# Patient Record
Sex: FEMALE
[De-identification: names, ages, dates, MRNs, and addresses within clinical notes are randomized per-mention and may not be internally consistent; named-entity substitution may affect disease eponyms.]

---

## 2019-08-28 ENCOUNTER — RX ONLY (OUTPATIENT)
Age: 59
Setting detail: RX ONLY
End: 2019-08-28

## 2023-08-29 ENCOUNTER — APPOINTMENT (RX ONLY)
Dept: URBAN - METROPOLITAN AREA CLINIC 55 | Facility: CLINIC | Age: 63
Setting detail: DERMATOLOGY
End: 2023-08-29

## 2023-08-29 DIAGNOSIS — Z41.9 ENCOUNTER FOR PROCEDURE FOR PURPOSES OTHER THAN REMEDYING HEALTH STATE, UNSPECIFIED: ICD-10-CM

## 2023-08-29 PROCEDURE — ? COSMETIC CONSULTATION: GENERAL

## 2023-08-29 PROCEDURE — ? INVENTORY

## 2023-08-29 PROCEDURE — ? BOTOX

## 2023-08-29 NOTE — PROCEDURE: BOTOX
Lcl Root Units: 0
Show Nasal Units: Yes
Detail Level: Detailed
Forehead Units: 10
Glabellar Complex Units: 20
Consent obtained. Risks include but not limited to lid/brow ptosis, bruising, swelling, diplopia, temporary effect, incomplete chemical denervation.
Show Inventory Tab: Hide
Show Ucl Units: No
Additional Area 2 Location: Nasalis
Post-Care Instructions: Patient instructed to not lie down for 4 hours and limit physical activity for 24 hours.
Dilution (U/0.1 Cc): 4
Additional Area 1 Location: Lip
Incrementing Botox Units: By 0.5 Units
Additional Area 3 Location: gummy smile
n/a

## 2023-09-13 ENCOUNTER — APPOINTMENT (RX ONLY)
Dept: URBAN - METROPOLITAN AREA CLINIC 55 | Facility: CLINIC | Age: 63
Setting detail: DERMATOLOGY
End: 2023-09-13

## 2023-09-13 DIAGNOSIS — Z41.9 ENCOUNTER FOR PROCEDURE FOR PURPOSES OTHER THAN REMEDYING HEALTH STATE, UNSPECIFIED: ICD-10-CM

## 2023-09-13 PROCEDURE — ? BOTOX

## 2023-09-13 PROCEDURE — ? FILLERS

## 2023-09-13 PROCEDURE — ? INVENTORY

## 2023-09-13 NOTE — PROCEDURE: BOTOX
Left Periorbital Skin Units: 0
Show Forehead Units: Yes
Show Ucl Units: No
Forehead Units: 4
Show Inventory Tab: Hide
Consent obtained. Risks include but not limited to lid/brow ptosis, bruising, swelling, diplopia, temporary effect, incomplete chemical denervation.
Additional Area 3 Location: gummy smile
Post-Care Instructions: Patient instructed to not lie down for 4 hours and limit physical activity for 24 hours.
Additional Area 2 Location: Nasalis
Detail Level: Detailed
Additional Area 1 Location: Lip
Incrementing Botox Units: By 0.5 Units

## 2023-09-13 NOTE — PROCEDURE: FILLERS
Nasolabial Folds Filler Volume In Cc: 0
Filler: RHA 3
Consent: Written consent obtained. Risks include but not limited to bruising, beading, irregular texture, ulceration, infection, allergic reaction, scar formation, incomplete augmentation, temporary nature, and procedural pain.
Post-Care Instructions: After the procedure, patient instructed to apply ice to reduce swelling.
Include Cannula Information In Note?: No
Include Cannula Information In Note?: Yes
Detail Level: Detailed
Anesthesia Type: 1% lidocaine with epinephrine
Anesthesia Volume In Cc: 0.5
Include Cannula Size?: 27G
Additional Anesthesia Volume In Cc: 6
Map Statment: See Attach Map for Complete Details
Filler: RHA 4
Aspiration Statement: Aspiration was performed prior to injecting site with filler.

## 2024-03-28 ENCOUNTER — APPOINTMENT (RX ONLY)
Dept: URBAN - METROPOLITAN AREA CLINIC 55 | Facility: CLINIC | Age: 64
Setting detail: DERMATOLOGY
End: 2024-03-28

## 2024-03-28 DIAGNOSIS — Z41.9 ENCOUNTER FOR PROCEDURE FOR PURPOSES OTHER THAN REMEDYING HEALTH STATE, UNSPECIFIED: ICD-10-CM

## 2024-03-28 PROCEDURE — ? INVENTORY

## 2024-03-28 PROCEDURE — ? BOTOX

## 2024-03-28 NOTE — PROCEDURE: BOTOX
Mentalis Units: 0
Forehead Units: 14
Additional Area 4 Units: 4
Show Additional Area 5: Yes
Show Inventory Tab: Hide
Show Right And Left Brow Units: No
Consent obtained. Risks include but not limited to lid/brow ptosis, bruising, swelling, diplopia, temporary effect, incomplete chemical denervation.
Glabellar Complex Units: 20
Additional Area 3 Location: gummy smile
Depressor Anguli Oris Units: 6
Post-Care Instructions: Patient instructed to not lie down for 4 hours and limit physical activity for 24 hours.
Additional Area 2 Location: Nasalis
Additional Area 1 Location: Lip
Detail Level: Detailed
Incrementing Botox Units: By 0.5 Units
Additional Area 4 Location: Upper Cutaneous Lip

## 2024-08-08 ENCOUNTER — APPOINTMENT (RX ONLY)
Dept: URBAN - METROPOLITAN AREA CLINIC 55 | Facility: CLINIC | Age: 64
Setting detail: DERMATOLOGY
End: 2024-08-08

## 2024-08-08 DIAGNOSIS — Z41.9 ENCOUNTER FOR PROCEDURE FOR PURPOSES OTHER THAN REMEDYING HEALTH STATE, UNSPECIFIED: ICD-10-CM

## 2024-08-08 PROCEDURE — ? FILLERS

## 2024-08-08 PROCEDURE — ? BOTOX

## 2024-08-08 PROCEDURE — ? INVENTORY

## 2024-08-08 NOTE — PROCEDURE: FILLERS
Additional Area 4 Volume In Cc: 0
Include Documentation That Aspiration Was Performed Prior To Injecting Filler:: No
Anesthesia Volume In Cc: 0.5
Aspiration Statement: Aspiration was performed prior to injecting site with filler.
Additional Anesthesia Volume In Cc: 6
Include Cannula Information In Note?: Yes
Detail Level: Detailed
Filler: RHA 2
Include Cannula Size?: 27G
Consent: Written consent obtained. Risks include but not limited to bruising, beading, irregular texture, ulceration, infection, allergic reaction, scar formation, incomplete augmentation, temporary nature, and procedural pain.
Post-Care Instructions: After the procedure, patient instructed to apply ice to reduce swelling.
Map Statment: See Attach Map for Complete Details
Anesthesia Type: 1% lidocaine with epinephrine

## 2024-08-08 NOTE — PROCEDURE: BOTOX
[facility] Units: 0
Show Additional Area 5: Yes
Show Ucl Units: No
Depressor Anguli Oris Units: 8
Additional Area 3 Location: gummy smile
Consent obtained. Risks include but not limited to lid/brow ptosis, bruising, swelling, diplopia, temporary effect, incomplete chemical denervation.
Additional Area 1 Location: Lip
Dilution (U/0.1 Cc): 4
Post-Care Instructions: Patient instructed to not lie down for 4 hours and limit physical activity for 24 hours.
Additional Area 2 Location: Nasalis
Incrementing Botox Units: By 0.5 Units
Detail Level: Detailed
Forehead Units: 14
Show Inventory Tab: Hide
Glabellar Complex Units: 20

## 2024-10-23 ENCOUNTER — APPOINTMENT (RX ONLY)
Dept: URBAN - METROPOLITAN AREA CLINIC 55 | Facility: CLINIC | Age: 64
Setting detail: DERMATOLOGY
End: 2024-10-23

## 2024-10-23 DIAGNOSIS — Z41.9 ENCOUNTER FOR PROCEDURE FOR PURPOSES OTHER THAN REMEDYING HEALTH STATE, UNSPECIFIED: ICD-10-CM

## 2024-10-23 PROCEDURE — ? BOTOX

## 2024-10-23 PROCEDURE — ? INVENTORY

## 2024-10-23 NOTE — PROCEDURE: BOTOX
Show Lateral Platysmal Band Units: Yes
Mentalis Units: 0
Incrementing Botox Units: By 0.5 Units
Show Right And Left Periorbital Units: No
Show Inventory Tab: Hide
Consent obtained. Risks include but not limited to lid/brow ptosis, bruising, swelling, diplopia, temporary effect, incomplete chemical denervation.
Additional Area 3 Location: gummy smile
Additional Area 1 Location: Lip
Post-Care Instructions: Patient instructed to not lie down for 4 hours and limit physical activity for 24 hours.
Detail Level: Detailed
Dilution (U/0.1 Cc): 4
Additional Area 2 Location: Nasalis